# Patient Record
Sex: FEMALE | Race: WHITE | NOT HISPANIC OR LATINO | Employment: FULL TIME | URBAN - METROPOLITAN AREA
[De-identification: names, ages, dates, MRNs, and addresses within clinical notes are randomized per-mention and may not be internally consistent; named-entity substitution may affect disease eponyms.]

---

## 2024-06-26 ENCOUNTER — HOSPITAL ENCOUNTER (OUTPATIENT)
Dept: RADIOLOGY | Facility: CLINIC | Age: 54
Discharge: HOME | End: 2024-06-26

## 2024-06-26 DIAGNOSIS — J01.90 ACUTE SINUSITIS, UNSPECIFIED: ICD-10-CM

## 2024-06-26 DIAGNOSIS — J20.9 ACUTE BRONCHITIS, UNSPECIFIED: ICD-10-CM

## 2024-06-26 PROCEDURE — 71046 X-RAY EXAM CHEST 2 VIEWS: CPT

## 2024-06-26 PROCEDURE — 71046 X-RAY EXAM CHEST 2 VIEWS: CPT | Performed by: RADIOLOGY

## 2025-02-01 ENCOUNTER — ANCILLARY PROCEDURE (OUTPATIENT)
Dept: URGENT CARE | Age: 55
End: 2025-02-01

## 2025-02-01 ENCOUNTER — OFFICE VISIT (OUTPATIENT)
Dept: URGENT CARE | Age: 55
End: 2025-02-01

## 2025-02-01 VITALS
SYSTOLIC BLOOD PRESSURE: 116 MMHG | HEART RATE: 71 BPM | WEIGHT: 102 LBS | DIASTOLIC BLOOD PRESSURE: 66 MMHG | TEMPERATURE: 98.2 F | OXYGEN SATURATION: 96 % | RESPIRATION RATE: 16 BRPM

## 2025-02-01 DIAGNOSIS — S62.614A DISPLACED FRACTURE OF PROXIMAL PHALANX OF RIGHT RING FINGER, INITIAL ENCOUNTER FOR CLOSED FRACTURE: Primary | ICD-10-CM

## 2025-02-01 DIAGNOSIS — M79.644 PAIN OF FINGER OF RIGHT HAND: ICD-10-CM

## 2025-02-01 PROCEDURE — 73140 X-RAY EXAM OF FINGER(S): CPT | Mod: RIGHT SIDE | Performed by: NURSE PRACTITIONER

## 2025-02-01 ASSESSMENT — ENCOUNTER SYMPTOMS
LOSS OF SENSATION IN FEET: 0
OCCASIONAL FEELINGS OF UNSTEADINESS: 0
DEPRESSION: 0

## 2025-02-01 ASSESSMENT — PATIENT HEALTH QUESTIONNAIRE - PHQ9
SUM OF ALL RESPONSES TO PHQ9 QUESTIONS 1 AND 2: 0
1. LITTLE INTEREST OR PLEASURE IN DOING THINGS: NOT AT ALL
2. FEELING DOWN, DEPRESSED OR HOPELESS: NOT AT ALL

## 2025-02-01 NOTE — PROGRESS NOTES
Subjective   Patient ID: Corine Simeon is a 54 y.o. female. They present today with a chief complaint of R finger injury (C/O R finger injury that happened the 28th. ).    History of Present Illness  53 yo female coming in for right ring finger pain and swelling. She states she was breaking up her dogs and while she was holding the collar of one of the dogs the dog moved and her finger was stuck in the collar. She states she does have some mild numbness in the tip of the finger. She denies any other hand pain.     Past Medical History  Allergies as of 02/01/2025    (No Known Allergies)       (Not in a hospital admission)       Past Medical History:   Diagnosis Date    Personal history of other endocrine, nutritional and metabolic disease     History of hypothyroidism    Personal history of other endocrine, nutritional and metabolic disease     History of Graves' disease       Past Surgical History:   Procedure Laterality Date    APPENDECTOMY  09/26/2018    Appendectomy    HYSTERECTOMY  09/26/2018    Hysterectomy    OTHER SURGICAL HISTORY  09/26/2018    Breast Surgery Enlargement Procedure    OTHER SURGICAL HISTORY  09/26/2018    Oral Surgery Tooth Extraction [place] Tooth    OTHER SURGICAL HISTORY  09/26/2018    History Of Prior Surgery        reports that she has been smoking cigarettes. She has never used smokeless tobacco. She reports current alcohol use. She reports that she does not use drugs.    Review of Systems  Review of Systems:  General: No weight loss, fatigue, anorexia, insomnia, fever, chills.  Cardiac: No chest pain, palpitations, syncope, near syncope.  Pulmonary:  No shortness of breath, cough, hemoptysis  Heme/lymph: No swollen glands, fever, bleeding  Musculoskeletal: No limb pain, Positive right ring finger pain, swelling, and bruising  Skin: No rashes                                 Objective    Vitals:    02/01/25 1206   BP: 116/66   BP Location: Left arm   Patient Position: Sitting   BP Cuff  Size: Adult   Pulse: 71   Resp: 16   Temp: 36.8 °C (98.2 °F)   TempSrc: Oral   SpO2: 96%   Weight: 46.3 kg (102 lb)     No LMP recorded.    Physical Exam  Hand Injury:  General: Vitals noted, no distress. Afebrile  Cardiac: Regular rate and rhythm, no murmur.   Pulmonary: Lungs clear bilaterally with good aeration. No adventitious breath sounds.   Extremities: No peripheral edema. Exam of the right  hand shows tenderness on palpation to the ring finger. There is noted edema and ecchymosis. The skin is intact. Is neurovascularly intact distally. Specifically, has decreased strength with flexion and extension of the digit due to pain and swelling. Is nontender over the wrist. Remainder of the extremity is nontender.  Skin: No rash  Neuro: No focal neurologic deficits, NIH score of 0      Procedures    Point of Care Test & Imaging Results from this visit  No results found for this visit on 02/01/25.   XR fingers right 2+ views    Result Date: 2/1/2025  Interpreted By:  Diana Woodson, STUDY: XR FINGERS RIGHT 2+ VIEWS; ;  2/1/2025 12:48 pm   INDICATION: Signs/Symptoms:pain of right ring finger.   ,M79.644 Pain in right finger(s)   COMPARISON: None.   ACCESSION NUMBER(S): [id number]   ORDERING CLINICIAN: TEENA MONTAÑO   FINDINGS: Three views of right 4th digit were performed.   There is an oblique fracture through proximal phalangeal shaft of 4th digit. There is mild volar and proximal displacement of the distal fracture fragment with mild overriding and impaction of fracture fragments. No definite evidence of intra-articular extension to the metacarpophalangeal or interphalangeal joints. There is diffuse soft tissue swelling about the 4th digit.       Acute mildly displaced and impacted fracture through proximal phalangeal shaft of 4th digit with associated soft tissue swelling.   MACRO: None   Signed by: Diana Woodson 2/1/2025 12:51 PM Dictation workstation:   [id number]     Diagnostic study results (if any)  were reviewed by ROSALIND Snyder.    Assessment/Plan   Allergies, medications, history, and pertinent labs/EKGs/Imaging reviewed by ROSALIND Snyder.     Medical Decision Making  Testing: XR right ring finger  Treatment: Finger splint applied. Ortho referral given  Differential: 1) finger sprain , 2) finger fracture, 3) contusion of finger  Plan: Patient will follow up with the PCP in the next 2-3 days. Return for any worsening symptoms or go to the ER for further evaluation. Patient understands return precautions and discharge insturctions.  Impression:   1) right ring finger fracture      Orders and Diagnoses  Diagnoses and all orders for this visit:  Displaced fracture of proximal phalanx of right ring finger, initial encounter for closed fracture  -     Finger splint  -     Referral to Orthopaedic Surgery; Future  Pain of finger of right hand  -     XR fingers right 2+ views; Future      Medical Admin Record      Patient disposition: Home    Electronically signed by ROSALIND Snyder  1:20 PM